# Patient Record
Sex: FEMALE | Race: WHITE
[De-identification: names, ages, dates, MRNs, and addresses within clinical notes are randomized per-mention and may not be internally consistent; named-entity substitution may affect disease eponyms.]

---

## 2018-04-18 ENCOUNTER — HOSPITAL ENCOUNTER (EMERGENCY)
Dept: HOSPITAL 74 - FER | Age: 73
Discharge: HOME | End: 2018-04-18
Payer: COMMERCIAL

## 2018-04-18 VITALS — TEMPERATURE: 98.4 F | SYSTOLIC BLOOD PRESSURE: 164 MMHG | DIASTOLIC BLOOD PRESSURE: 67 MMHG | HEART RATE: 81 BPM

## 2018-04-18 VITALS — BODY MASS INDEX: 17.9 KG/M2

## 2018-04-18 DIAGNOSIS — E78.00: ICD-10-CM

## 2018-04-18 DIAGNOSIS — R07.9: Primary | ICD-10-CM

## 2018-04-18 LAB
ALBUMIN SERPL-MCNC: 4.3 G/DL (ref 3.5–5)
ALP SERPL-CCNC: 70 U/L (ref 32–92)
ALT SERPL-CCNC: 15 U/L (ref 10–40)
ANION GAP SERPL CALC-SCNC: 7 MMOL/L (ref 8–16)
AST SERPL-CCNC: 24 U/L (ref 10–42)
BASOPHILS # BLD: 0.6 % (ref 0–2)
BILIRUB SERPL-MCNC: 0.5 MG/DL (ref 0.2–1)
BUN SERPL-MCNC: 12 MG/DL (ref 7–18)
CALCIUM SERPL-MCNC: 8.9 MG/DL (ref 8.4–10.2)
CHLORIDE SERPL-SCNC: 105 MMOL/L (ref 98–107)
CO2 SERPL-SCNC: 26 MMOL/L (ref 22–28)
CREAT SERPL-MCNC: 0.8 MG/DL (ref 0.6–1.3)
DEPRECATED RDW RBC AUTO: 13.7 % (ref 11.6–15.6)
EOSINOPHIL # BLD: 2 % (ref 0–4.5)
GLUCOSE SERPL-MCNC: 88 MG/DL (ref 74–106)
HCT VFR BLD CALC: 37.6 % (ref 32.4–45.2)
HGB BLD-MCNC: 12.7 GM/DL (ref 10.7–15.3)
LYMPHOCYTES # BLD: 14.3 % (ref 8–40)
MCH RBC QN AUTO: 31.5 PG (ref 25.7–33.7)
MCHC RBC AUTO-ENTMCNC: 33.7 G/DL (ref 32–36)
MCV RBC: 93.6 FL (ref 80–96)
MONOCYTES # BLD AUTO: 7 % (ref 3.8–10.2)
NEUTROPHILS # BLD: 76.1 % (ref 42.8–82.8)
PLATELET # BLD AUTO: 311 K/MM3 (ref 134–434)
PMV BLD: 8.4 FL (ref 7.5–11.1)
POTASSIUM SERPLBLD-SCNC: 4 MMOL/L (ref 3.5–5.1)
PROT SERPL-MCNC: 7.1 G/DL (ref 6.4–8.3)
RBC # BLD AUTO: 4.02 M/MM3 (ref 3.6–5.2)
SODIUM SERPL-SCNC: 138 MMOL/L (ref 136–145)
WBC # BLD AUTO: 5 K/MM3 (ref 4–10)

## 2018-04-18 NOTE — EKG
Test Reason : 

Blood Pressure : ***/*** mmHG

Vent. Rate : 071 BPM     Atrial Rate : 071 BPM

   P-R Int : 124 ms          QRS Dur : 076 ms

    QT Int : 398 ms       P-R-T Axes : 078 051 037 degrees

   QTc Int : 432 ms

 

NORMAL SINUS RHYTHM

POSSIBLE LEFT ATRIAL ENLARGEMENT

BORDERLINE ECG

WHEN COMPARED WITH ECG OF 30-JAN-2011 23:06,

PREMATURE SUPRAVENTRICULAR COMPLEXES ARE NO LONGER PRESENT

Confirmed by SHAW HOU, JESUS ([id number]) on 4/18/2018 12:41:36 PM

 

Referred By: LINDA PHILLIP           Confirmed By:JESUS SQUIRES MD

## 2018-04-18 NOTE — PDOC
History of Present Illness





- General


Chief Complaint: Pain


Stated Complaint: LEFT RIB/PAIN


Time Seen by Provider: 04/18/18 07:15


History Source: Patient


Exam Limitations: No Limitations





- History of Present Illness


Initial Comments: 





73 yo F history HL, osteoporosis presents with L sided chest pain. She states 

that it started last night while she was on the phone at 8:30pm. She has been 

under recent emotional stress. She states that she felt the pain last night, 

took tylenol with incomplete relief, felt mild pain when she went to bed last 

night around 11pm. She still has mild pain today despite tylenol. No N/V, 

sweating, SOB. Pain is not pleuritic. No recent leg swelling. She states that 

she had similar pain about 6 months ago, occurred after she was exercising at 

the gym. She took tylenol at the time and did not think much of it. She saw her 

cardiologist Dr. Monge recently for routine follow-up, but did not mention 

the event. She had a stress test and a cardiac cath about 5 years ago, both 

reportedly negative. She had a recent echo with Dr. Monge.








Past History





- Past Medical History


Allergies/Adverse Reactions: 


 Allergies











Allergy/AdvReac Type Severity Reaction Status Date / Time


 


No Known Allergies Allergy   Verified 04/18/18 07:16











Home Medications: 


Ambulatory Orders





Acetaminophen [Tylenol] 325 mg PO PRN PRN 12/28/12 


Rosuvastatin Calcium [Crestor] 5 mg PO DAILY 12/28/12 


Estrogen,Con/M-Progest Acet [Prempro 0.45-1.5 mg Tablet] 1 each PO DAILY 04/18/ 18 








COPD: No


Hypercholesterolemia: Yes





- Suicide/Smoking/Psychosocial Hx


Smoking Status: No


Smoking History: Unknown if ever smoked


Have you smoked in the past 12 months: No


Number of Cigarettes Smoked Daily: 0


Information on smoking cessation initiated: No


Hx Alcohol Use: No


Drug/Substance Use Hx: No


Substance Use Type: None





**Review of Systems





- Review of Systems


Able to Perform ROS?: Yes


Comments:: 





GENERAL/CONSTITUTIONAL: No fever or chills. No weakness.


HEAD, EYES, EARS, NOSE AND THROAT: No change in vision. No ear pain or 

discharge. No sore throat.


CARDIOVASCULAR: No shortness of breath. +Chest pain


RESPIRATORY: No cough, wheezing, or hemoptysis.


GASTROINTESTINAL: No nausea, vomiting, diarrhea or constipation.


GENITOURINARY: No dysuria, frequency, or change in urination.


MUSCULOSKELETAL: No joint or muscle swelling or pain. No neck or back pain.


SKIN: No rash


NEUROLOGIC: No headache, vertigo, loss of consciousness, or change in strength/

sensation.


ENDOCRINE: No increased thirst. No abnormal weight change.


HEMATOLOGIC/LYMPHATIC: No anemia, easy bleeding, or history of blood clots.


ALLERGIC/IMMUNOLOGIC: No hives or skin allergy.











*Physical Exam





- Vital Signs


 Last Vital Signs











Temp Pulse Resp BP Pulse Ox


 


 98.4 F   81   20   164/67   100 


 


 04/18/18 07:14  04/18/18 07:14  04/18/18 07:14  04/18/18 07:14  04/18/18 07:14














- Physical Exam


Comments: 





GENERAL: Awake, alert, and fully oriented, in no acute distress


HEAD: No signs of trauma


EYES: PERRLA, EOMI, sclera anicteric, conjunctiva clear


ENT: Auricles normal inspection, hearing grossly normal, nares patent, 

oropharynx clear without exudates. Moist mucosa


NECK: Normal ROM, supple, no lymphadenopathy, JVD, or masses


LUNGS: Breath sounds equal, clear to auscultation bilaterally.  No wheezes, and 

no crackles. Pain is not reproducible. 


HEART: Regular rate and rhythm, normal S1 and S2, no murmurs, rubs or gallops


ABDOMEN: Soft, nontender, normoactive bowel sounds.  No guarding, no rebound.  

No masses


EXTREMITIES: Normal range of motion, no edema.  No clubbing or cyanosis. No 

cords, erythema, or tenderness


NEUROLOGICAL: Cranial nerves II through XII grossly intact.  Normal speech, 

normal gait


SKIN: Warm, Dry, normal turgor, no rashes or lesions noted.








**Heart Score/ECG Review





- History


History: Slightly suspicious





- Electrocardiogram


EKG: Normal





- Age


Age: >/= 65





- Risk Factors


Risk Factors Heart Score: Yes Hx Hypercholesterolemia


Based on the list above the patient has:: 1-2 risk factors





- Troponin


Troponin: </= normal limit





- Score


Heart Score - Total: 3





- ECG Impressions


Comment:: 





EKG read 08:35- NSR 71 bpm, no acute ST/T changes








ED Treatment Course





- LABORATORY


CBC & Chemistry Diagram: 


 04/18/18 08:22





 04/18/18 08:22





Medical Decision Making





- Medical Decision Making





04/18/18 08:33


Pt with L cp since last night, still with lingering pain today. She has prior 

similar symptoms following exercise a few months ago. Will obtain labs, CXR, 

and EKG. Will contact Dr. Monge when results return.





04/18/18 10:37


Case d/w Dr. Monge. Reviewed clinical findings. Recommended outpatient f/u. 








*DC/Admit/Observation/Transfer


Diagnosis at time of Disposition: 


Chest pain


Qualifiers:


 Chest pain type: unspecified Qualified Code(s): R07.9 - Chest pain, unspecified








- Discharge Dispostion


Disposition: HOME


Condition at time of disposition: Stable


Admit: No





- Referrals





- Patient Instructions





- Post Discharge Activity

## 2021-11-05 ENCOUNTER — HOSPITAL ENCOUNTER (EMERGENCY)
Dept: HOSPITAL 74 - JER | Age: 76
LOS: 1 days | Discharge: HOME | End: 2021-11-06
Payer: COMMERCIAL

## 2021-11-05 VITALS — BODY MASS INDEX: 17.2 KG/M2

## 2021-11-05 VITALS — TEMPERATURE: 98.8 F

## 2021-11-05 DIAGNOSIS — I10: Primary | ICD-10-CM

## 2021-11-06 VITALS — DIASTOLIC BLOOD PRESSURE: 87 MMHG | SYSTOLIC BLOOD PRESSURE: 158 MMHG | HEART RATE: 72 BPM

## 2021-11-14 ENCOUNTER — HOSPITAL ENCOUNTER (EMERGENCY)
Dept: HOSPITAL 74 - FER | Age: 76
Discharge: HOME | End: 2021-11-14
Payer: COMMERCIAL

## 2021-11-14 VITALS — TEMPERATURE: 98 F

## 2021-11-14 VITALS — HEART RATE: 80 BPM | DIASTOLIC BLOOD PRESSURE: 67 MMHG | SYSTOLIC BLOOD PRESSURE: 156 MMHG

## 2021-11-14 VITALS — BODY MASS INDEX: 17.2 KG/M2

## 2021-11-14 DIAGNOSIS — I10: Primary | ICD-10-CM

## 2022-09-02 ENCOUNTER — HOSPITAL ENCOUNTER (EMERGENCY)
Dept: HOSPITAL 74 - FER | Age: 77
Discharge: HOME | End: 2022-09-02
Payer: COMMERCIAL

## 2022-09-02 VITALS
DIASTOLIC BLOOD PRESSURE: 74 MMHG | HEART RATE: 90 BPM | TEMPERATURE: 98 F | SYSTOLIC BLOOD PRESSURE: 167 MMHG | RESPIRATION RATE: 16 BRPM

## 2022-09-02 VITALS — BODY MASS INDEX: 17.2 KG/M2

## 2022-09-02 DIAGNOSIS — S01.80XA: Primary | ICD-10-CM
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
